# Patient Record
Sex: FEMALE | Race: WHITE | HISPANIC OR LATINO | Employment: FULL TIME | URBAN - METROPOLITAN AREA
[De-identification: names, ages, dates, MRNs, and addresses within clinical notes are randomized per-mention and may not be internally consistent; named-entity substitution may affect disease eponyms.]

---

## 2023-07-24 ENCOUNTER — OFFICE VISIT (OUTPATIENT)
Dept: INTERNAL MEDICINE | Facility: CLINIC | Age: 48
End: 2023-07-24
Payer: COMMERCIAL

## 2023-07-24 ENCOUNTER — LAB (OUTPATIENT)
Dept: INTERNAL MEDICINE | Facility: CLINIC | Age: 48
End: 2023-07-24
Payer: COMMERCIAL

## 2023-07-24 VITALS
BODY MASS INDEX: 35.42 KG/M2 | WEIGHT: 187.6 LBS | HEART RATE: 79 BPM | DIASTOLIC BLOOD PRESSURE: 84 MMHG | HEIGHT: 61 IN | SYSTOLIC BLOOD PRESSURE: 130 MMHG | TEMPERATURE: 97.7 F | OXYGEN SATURATION: 97 %

## 2023-07-24 DIAGNOSIS — Z11.59 NEED FOR HEPATITIS C SCREENING TEST: ICD-10-CM

## 2023-07-24 DIAGNOSIS — E66.01 MORBID (SEVERE) OBESITY DUE TO EXCESS CALORIES: ICD-10-CM

## 2023-07-24 DIAGNOSIS — I10 HYPERTENSION, UNSPECIFIED TYPE: Primary | ICD-10-CM

## 2023-07-24 DIAGNOSIS — I10 HYPERTENSION, UNSPECIFIED TYPE: ICD-10-CM

## 2023-07-24 DIAGNOSIS — E03.9 HYPOTHYROIDISM, UNSPECIFIED TYPE: ICD-10-CM

## 2023-07-24 DIAGNOSIS — R60.0 PEDAL EDEMA: ICD-10-CM

## 2023-07-24 LAB
BASOPHILS # BLD AUTO: 0.04 10*3/MM3 (ref 0–0.2)
BASOPHILS NFR BLD AUTO: 0.5 % (ref 0–1.5)
DEPRECATED RDW RBC AUTO: 43.8 FL (ref 37–54)
EOSINOPHIL # BLD AUTO: 0.1 10*3/MM3 (ref 0–0.4)
EOSINOPHIL NFR BLD AUTO: 1.3 % (ref 0.3–6.2)
ERYTHROCYTE [DISTWIDTH] IN BLOOD BY AUTOMATED COUNT: 14.1 % (ref 12.3–15.4)
HBA1C MFR BLD: 5.9 % (ref 4.8–5.6)
HCT VFR BLD AUTO: 39.1 % (ref 34–46.6)
HGB BLD-MCNC: 13.1 G/DL (ref 12–15.9)
IMM GRANULOCYTES # BLD AUTO: 0.03 10*3/MM3 (ref 0–0.05)
IMM GRANULOCYTES NFR BLD AUTO: 0.4 % (ref 0–0.5)
LYMPHOCYTES # BLD AUTO: 1.78 10*3/MM3 (ref 0.7–3.1)
LYMPHOCYTES NFR BLD AUTO: 22.3 % (ref 19.6–45.3)
MCH RBC QN AUTO: 28.7 PG (ref 26.6–33)
MCHC RBC AUTO-ENTMCNC: 33.5 G/DL (ref 31.5–35.7)
MCV RBC AUTO: 85.6 FL (ref 79–97)
MONOCYTES # BLD AUTO: 0.64 10*3/MM3 (ref 0.1–0.9)
MONOCYTES NFR BLD AUTO: 8 % (ref 5–12)
NEUTROPHILS NFR BLD AUTO: 5.4 10*3/MM3 (ref 1.7–7)
NEUTROPHILS NFR BLD AUTO: 67.5 % (ref 42.7–76)
NRBC BLD AUTO-RTO: 0 /100 WBC (ref 0–0.2)
PLATELET # BLD AUTO: 237 10*3/MM3 (ref 140–450)
PMV BLD AUTO: 11.2 FL (ref 6–12)
RBC # BLD AUTO: 4.57 10*6/MM3 (ref 3.77–5.28)
WBC NRBC COR # BLD: 7.99 10*3/MM3 (ref 3.4–10.8)

## 2023-07-24 PROCEDURE — 84439 ASSAY OF FREE THYROXINE: CPT | Performed by: STUDENT IN AN ORGANIZED HEALTH CARE EDUCATION/TRAINING PROGRAM

## 2023-07-24 PROCEDURE — 83036 HEMOGLOBIN GLYCOSYLATED A1C: CPT | Performed by: STUDENT IN AN ORGANIZED HEALTH CARE EDUCATION/TRAINING PROGRAM

## 2023-07-24 PROCEDURE — 36415 COLL VENOUS BLD VENIPUNCTURE: CPT | Performed by: STUDENT IN AN ORGANIZED HEALTH CARE EDUCATION/TRAINING PROGRAM

## 2023-07-24 PROCEDURE — 86803 HEPATITIS C AB TEST: CPT | Performed by: STUDENT IN AN ORGANIZED HEALTH CARE EDUCATION/TRAINING PROGRAM

## 2023-07-24 PROCEDURE — 80050 GENERAL HEALTH PANEL: CPT | Performed by: STUDENT IN AN ORGANIZED HEALTH CARE EDUCATION/TRAINING PROGRAM

## 2023-07-24 PROCEDURE — 80061 LIPID PANEL: CPT | Performed by: STUDENT IN AN ORGANIZED HEALTH CARE EDUCATION/TRAINING PROGRAM

## 2023-07-24 RX ORDER — HYDROCHLOROTHIAZIDE 25 MG/1
25 TABLET ORAL DAILY PRN
Qty: 90 TABLET | Refills: 1 | Status: SHIPPED | OUTPATIENT
Start: 2023-07-24

## 2023-07-24 NOTE — PROGRESS NOTES
Office Note     Name: Kelly Davila    : 1975     MRN: [id number]     Chief Complaint  Foot Swelling (Last Friday went to . F/u /Stated meds have helped )    Subjective     History of Present Illness:  Kelly Davila is a 47 y.o. female who presents today for     New patient here  Was seen at urgent care on 2023 due to bilateral feet swelling.  swelling in her feet that is been more prominent over the past week She states that she is on her feet for 8 hours a day and she has noticed an increase in swelling.She denies any shortness of breath. She has had no chest pain. She denies cough. She has had no pain in the legs.  She was also found to have elevated BP at 147/97. She was started on HCTZ which has helped with leg swelling.        Review of Systems:   Review of Systems   All other systems reviewed and are negative.    Past Medical History:   Past Medical History:   Diagnosis Date    Disease of thyroid gland        Past Surgical History:   Past Surgical History:   Procedure Laterality Date     SECTION      HYSTERECTOMY      VASCULAR SURGERY         Family History: History reviewed. No pertinent family history.    Social History:   Social History     Socioeconomic History    Marital status:    Tobacco Use    Smoking status: Never    Smokeless tobacco: Never   Vaping Use    Vaping Use: Never used   Substance and Sexual Activity    Alcohol use: Never    Drug use: Never       Immunizations:   Immunization History   Administered Date(s) Administered    COVID-19 (MODERNA) 1st,2nd,3rd Dose Monovalent 10/01/2021        Medications:     Current Outpatient Medications:     hydroCHLOROthiazide (HYDRODIURIL) 25 MG tablet, Take 1 tablet by mouth Daily As Needed (swelling)., Disp: 90 tablet, Rfl: 1    levothyroxine (SYNTHROID, LEVOTHROID) 25 MCG tablet, Take 1 tablet by mouth Every Morning., Disp: , Rfl:     Allergies:   No Known Allergies    Objective     Vital Signs  /84    "Pulse 79   Temp 97.7 °F (36.5 °C) (Temporal)   Ht 155.6 cm (61.25\")   Wt 85.1 kg (187 lb 9.6 oz)   SpO2 97%   BMI 35.16 kg/m²   Estimated body mass index is 35.16 kg/m² as calculated from the following:    Height as of this encounter: 155.6 cm (61.25\").    Weight as of this encounter: 85.1 kg (187 lb 9.6 oz).    Class 2 Severe Obesity (BMI >=35 and <=39.9). Obesity-related health conditions include the following: hypertension. Obesity is unchanged. BMI is is above average; BMI management plan is completed. We discussed low calorie, low carb based diet program, portion control, and increasing exercise.      Physical Exam  Constitutional:       Appearance: Normal appearance. She is obese.   Cardiovascular:      Rate and Rhythm: Normal rate and regular rhythm.      Pulses: Normal pulses.      Heart sounds: Normal heart sounds.   Pulmonary:      Effort: Pulmonary effort is normal.      Breath sounds: Normal breath sounds.   Musculoskeletal:         General: No swelling.      Right lower leg: No edema.      Left lower leg: No edema.   Neurological:      General: No focal deficit present.      Mental Status: She is alert and oriented to person, place, and time.   Psychiatric:         Mood and Affect: Mood normal.         Behavior: Behavior normal.        Result Review :     Common labs          7/24/2023    16:24   Common Labs   Glucose 94    BUN 12    Creatinine 0.75    Sodium 141    Potassium 4.0    Chloride 105    Calcium 9.1    Albumin 4.1    Total Bilirubin <0.2    Alkaline Phosphatase 71    AST (SGOT) 17    ALT (SGPT) 24    WBC 7.99    Hemoglobin 13.1    Hematocrit 39.1    Platelets 237    Total Cholesterol 143    Triglycerides 165    HDL Cholesterol 40    LDL Cholesterol  75    Hemoglobin A1C 5.90                 Assessment and Plan     1. Hypertension, unspecified type  Recently diagnosed, started on HCTZ 25mg daily, continue with bp medications  Low salt diet, physical activity and weight controll  - " Comprehensive Metabolic Panel  - TSH; Future    2. Pedal edema  Discussed frequent leg elevations, compressing stockings  - hydroCHLOROthiazide (HYDRODIURIL) 25 MG tablet; Take 1 tablet by mouth Daily As Needed (swelling).  Dispense: 90 tablet; Refill: 1  - Comprehensive Metabolic Panel    3. Need for hepatitis C screening test    - HCV Antibody Rfx To Qnt PCR; Future    4. Morbid (severe) obesity due to excess calories  Discussed risk associated with obesity. she was given instructions on calorie counting as well as on decreasing carbohydrate intake. she was also encouraged to start exercise regimen.  Instructed patient to download fitness saúl on her smart phone to aid in calorie counting and exercise tracking.    - Lipid Panel; Future  - Hemoglobin A1c; Future    5. Hypothyroidism, unspecified type    - CBC Auto Differential; Future  - Comprehensive Metabolic Panel  - TSH; Future  - T4, free; Future       Follow Up  Return in about 3 months (around 10/24/2023) for Physical.    Jimmy White MD  [facility]  [facility]  [address]  [address] [address] [phone]

## 2023-07-25 LAB
ALBUMIN SERPL-MCNC: 4.1 G/DL (ref 3.5–5.2)
ALBUMIN/GLOB SERPL: 1.5 G/DL
ALP SERPL-CCNC: 71 U/L (ref 39–117)
ALT SERPL W P-5'-P-CCNC: 24 U/L (ref 1–33)
ANION GAP SERPL CALCULATED.3IONS-SCNC: 10.8 MMOL/L (ref 5–15)
AST SERPL-CCNC: 17 U/L (ref 1–32)
BILIRUB SERPL-MCNC: <0.2 MG/DL (ref 0–1.2)
BUN SERPL-MCNC: 12 MG/DL (ref 6–20)
BUN/CREAT SERPL: 16 (ref 7–25)
CALCIUM SPEC-SCNC: 9.1 MG/DL (ref 8.6–10.5)
CHLORIDE SERPL-SCNC: 105 MMOL/L (ref 98–107)
CHOLEST SERPL-MCNC: 143 MG/DL (ref 0–200)
CO2 SERPL-SCNC: 25.2 MMOL/L (ref 22–29)
CREAT SERPL-MCNC: 0.75 MG/DL (ref 0.57–1)
EGFRCR SERPLBLD CKD-EPI 2021: 99 ML/MIN/1.73
GLOBULIN UR ELPH-MCNC: 2.7 GM/DL
GLUCOSE SERPL-MCNC: 94 MG/DL (ref 65–99)
HDLC SERPL-MCNC: 40 MG/DL (ref 40–60)
LDLC SERPL CALC-MCNC: 75 MG/DL (ref 0–100)
LDLC/HDLC SERPL: 1.75 {RATIO}
POTASSIUM SERPL-SCNC: 4 MMOL/L (ref 3.5–5.2)
PROT SERPL-MCNC: 6.8 G/DL (ref 6–8.5)
SODIUM SERPL-SCNC: 141 MMOL/L (ref 136–145)
T4 FREE SERPL-MCNC: 1.12 NG/DL (ref 0.93–1.7)
TRIGL SERPL-MCNC: 165 MG/DL (ref 0–150)
TSH SERPL DL<=0.05 MIU/L-ACNC: 3.89 UIU/ML (ref 0.27–4.2)
VLDLC SERPL-MCNC: 28 MG/DL (ref 5–40)

## 2023-07-26 LAB
HCV AB SERPL QL IA: NORMAL
HCV IGG SERPL QL IA: NON REACTIVE

## 2023-07-31 ENCOUNTER — TELEPHONE (OUTPATIENT)
Dept: INTERNAL MEDICINE | Facility: CLINIC | Age: 48
End: 2023-07-31
Payer: COMMERCIAL

## 2024-02-03 DIAGNOSIS — R60.0 PEDAL EDEMA: ICD-10-CM

## 2024-02-06 NOTE — TELEPHONE ENCOUNTER
REFILL REQUEST HYDROCHLOROTHIAZIDE   LAST REFILL 7/24/23  LAST VISIT 7/24/23    CALLED PT WITH  ASSISTANCE TO SCHEDULE AN APPT FOR F/U AND REFILL LFT VM WITH CB NUMBER     OK FOR HUB TO RELAY MESSAGE AND SCHEDULE AN APPT

## 2024-02-07 NOTE — TELEPHONE ENCOUNTER
REFILL REQUEST HYDROCHLOROTHIAZIDE   LAST REFILL 7/24/23  LAST VISIT 7/24/23     2nd attempt   CALLED PT WITH  ASSISTANCE TO SCHEDULE AN APPT FOR F/U AND REFILL LFT VM WITH CB NUMBER      OK FOR HUB TO RELAY MESSAGE AND SCHEDULE AN APPT

## 2024-02-08 RX ORDER — HYDROCHLOROTHIAZIDE 25 MG/1
25 TABLET ORAL DAILY PRN
Qty: 90 TABLET | Refills: 1 | OUTPATIENT
Start: 2024-02-08

## 2024-02-08 NOTE — TELEPHONE ENCOUNTER
REFILL REQUEST denied pt needs appt  HYDROCHLOROTHIAZIDE   LAST REFILL 7/24/23  LAST VISIT 7/24/23      2nd attempt   CALLED PT WITH  ASSISTANCE TO SCHEDULE AN APPT FOR F/U AND REFILL LFT VM WITH CB NUMBER      OK FOR HUB TO RELAY MESSAGE AND SCHEDULE AN APPT

## 2024-11-05 ENCOUNTER — HOSPITAL ENCOUNTER (EMERGENCY)
Facility: HOSPITAL | Age: 49
Discharge: HOME | End: 2024-11-05
Attending: EMERGENCY MEDICINE | Admitting: EMERGENCY MEDICINE
Payer: COMMERCIAL

## 2024-11-05 VITALS
WEIGHT: 197 LBS | HEIGHT: 61 IN | DIASTOLIC BLOOD PRESSURE: 99 MMHG | TEMPERATURE: 98.5 F | BODY MASS INDEX: 37.19 KG/M2 | RESPIRATION RATE: 18 BRPM | HEART RATE: 75 BPM | OXYGEN SATURATION: 94 % | SYSTOLIC BLOOD PRESSURE: 174 MMHG

## 2024-11-05 DIAGNOSIS — T14.8XXA SKIN AVULSION: ICD-10-CM

## 2024-11-05 DIAGNOSIS — S49.91XA INJURY OF RIGHT UPPER EXTREMITY, INITIAL ENCOUNTER: Primary | ICD-10-CM

## 2024-11-05 PROCEDURE — 99283 EMERGENCY DEPT VISIT LOW MDM: CPT

## 2024-11-05 PROCEDURE — 25010000002 TETANUS-DIPHTH-ACELL PERTUSSIS 5-2.5-18.5 LF-MCG/0.5 SUSPENSION PREFILLED SYRINGE: Performed by: PHYSICIAN ASSISTANT

## 2024-11-05 PROCEDURE — 90471 IMMUNIZATION ADMIN: CPT | Performed by: PHYSICIAN ASSISTANT

## 2024-11-05 PROCEDURE — 90715 TDAP VACCINE 7 YRS/> IM: CPT | Performed by: PHYSICIAN ASSISTANT

## 2024-11-05 RX ORDER — MUPIROCIN 20 MG/G
1 OINTMENT TOPICAL ONCE
Status: COMPLETED | OUTPATIENT
Start: 2024-11-05 | End: 2024-11-05

## 2024-11-05 RX ADMIN — MUPIROCIN 1 APPLICATION: 20 OINTMENT TOPICAL at 16:22

## 2024-11-05 RX ADMIN — TETANUS TOXOID, REDUCED DIPHTHERIA TOXOID AND ACELLULAR PERTUSSIS VACCINE, ADSORBED 0.5 ML: 5; 2.5; 8; 8; 2.5 SUSPENSION INTRAMUSCULAR at 16:20

## 2024-11-05 NOTE — ED PROVIDER NOTES
Subjective   History of Present Illness  Pt is a 48 yo female presenting to ED with complaints of right arm injury. PMHx significant for Hypothyroidism. Pt injured right arm this past Friday at work. 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 She has partial skin tear and contusion to right forearm. Majority of skin intact. Pt explains tried to file incident report at work but supervisor wouldn't let her. Pt came to ED for wound check. Patient states able to use arm without significant difficulty but wasn't sure how to care for the wound. Unsure last Tdap. Denies fever or chills. Denies new or worse sx.. NO numbness or weakness to arm.     History provided by:  Patient, medical records and relative   used: Yes        Review of Systems   Constitutional:  Negative for chills and fever.   Musculoskeletal:  Negative for arthralgias.   Skin:  Positive for wound.   Neurological:  Negative for weakness and numbness.       Past Medical History:   Diagnosis Date    Disease of thyroid gland        No Known Allergies    Past Surgical History:   Procedure Laterality Date     SECTION      HYSTERECTOMY      VASCULAR SURGERY         History reviewed. No pertinent family history.    Social History     Socioeconomic History    Marital status:    Tobacco Use    Smoking status: Never    Smokeless tobacco: Never   Vaping Use    Vaping status: Never Used   Substance and Sexual Activity    Alcohol use: Never    Drug use: Never           Objective   Physical Exam  Vitals and nursing note reviewed.   Constitutional:       General: She is not in acute distress.  HENT:      Head: Atraumatic.   Eyes:      Conjunctiva/sclera: Conjunctivae normal.   Cardiovascular:      Rate and Rhythm: Normal rate.   Pulmonary:      Effort: Pulmonary effort is normal. No respiratory distress.   Musculoskeletal:         General:  "Normal range of motion.      Cervical back: Normal range of motion and neck supple.   Skin:     General: Skin is warm.      Findings: Wound (right forearm, see picture) present.   Neurological:      General: No focal deficit present.      Mental Status: She is alert.   Psychiatric:         Mood and Affect: Mood normal.         Behavior: Behavior normal.         Procedures           ED Course  ED Course as of 11/05/24 1954 Tue Nov 05, 2024   1620 Patient given Tdap in ED and discussed outpatient f/u with workers comp. Discussed wound care.  [RT]      ED Course User Index  [RT] Rebecca Guevara, PA      No results found for this or any previous visit (from the past 24 hours).  Note: In addition to lab results from this visit, the labs listed above may include labs taken at another facility or during a different encounter within the last 24 hours. Please correlate lab times with ED admission and discharge times for further clarification of the services performed during this visit.    No orders to display     Vitals:    11/05/24 1509   BP: 174/99   BP Location: Left arm   Patient Position: Sitting   Pulse: 75   Resp: 18   Temp: 98.5 °F (36.9 °C)   TempSrc: Oral   SpO2: 94%   Weight: 89.4 kg (197 lb)   Height: 155.6 cm (61.25\")     Medications   Tetanus-Diphth-Acell Pertussis (BOOSTRIX) injection 0.5 mL (0.5 mL Intramuscular Given 11/5/24 1620)   mupirocin (BACTROBAN) 2 % ointment 1 Application (1 Application Topical Given 11/5/24 1622)     ECG/EMG Results (last 24 hours)       ** No results found for the last 24 hours. **          No orders to display                                                Medical Decision Making  Pt is a 50 yo female presenting to ED with complaints of right arm injury. I had a discussion with the patient / family regarding ED course, diagnosis, diagnostic results and treatment plan including medications and admission / discharge. Discussed if new or worse symptoms / concerns to return to ED " for further evaluation. Discussed need for close follow up with PCP / specialists     DDx  Laceration, Skin tear, Fracture, Cellulitis     Problems Addressed:  Injury of right upper extremity, initial encounter: complicated acute illness or injury  Skin avulsion: complicated acute illness or injury    Amount and/or Complexity of Data Reviewed  Independent Historian:      Details: Family   External Data Reviewed: notes.     Details: Reviewed previous non ED visits including prior labs, imaging, available notes, medications, allergies and surgical hx.       Risk  Prescription drug management.        Final diagnoses:   Injury of right upper extremity, initial encounter   Skin avulsion       ED Disposition  ED Disposition       ED Disposition   Discharge    Condition   Stable    Comment   --               Jimmy White MD  [address] [facility] [address]  [place] [address]  [phone]    Schedule an appointment as soon as possible for a visit       [facility] OCCUPATIONAL MEDICINE  [address] [facility] [address] [phone]        [facility] EMERGENCY DEPART[facility]  [address] [facility] [address] [phone]    If symptoms worsen         Medication List      No changes were made to your prescriptions during this visit.            Rebecca Guevara PA  11/05/24 1954